# Patient Record
(demographics unavailable — no encounter records)

---

## 2024-10-10 NOTE — HISTORY OF PRESENT ILLNESS
[FreeTextEntry1] : s/p 1st mpj fusion of RF (DOS 6/27/24).  -Patient stated that she is still having pain on the Right Hallux.  Patient stated that she is having issues with ambulation.  Pt indicated that she would like to discuss further sx intervention to help improve alignment of Right Hallux
[FreeTextEntry1] : s/p 1st mpj fusion of RF (DOS 6/27/24).  -Patient stated that she is still having pain on the Right Hallux.  Patient stated that she is having issues with ambulation.  Pt indicated that she would like to discuss further sx intervention to help improve alignment of Right Hallux
Statement Selected

## 2024-10-10 NOTE — ASSESSMENT
[Verbal] : verbal [Patient] : patient [FreeTextEntry1] : S/P R 1st MPJ Fusion   -Patient seen and evaluated in clinic today with all questions and concerns addressed and answered. -Continuation with cam boot WBAT.  -Pt will undergo surgery in November for Revision of Right 1st MPJ Fusion site.

## 2024-10-10 NOTE — PHYSICAL EXAM
[2+] : left foot dorsalis pedis 2+ [Ankle Swelling (On Exam)] : not present [de-identified] : pain on palpation bilateral 1st MPJ limited ROM dorsiflexion 1st MPJ prominent 1st met head right foot  [FreeTextEntry1] : Medial aspect of the right 1st MPJ with overlying fibrotic crust. Periwound erythema. No active drainage.  [Diminished Throughout Right Foot] : normal sensation with monofilament testing throughout right foot [Diminished Throughout Left Foot] : normal sensation with monofilament testing throughout left foot

## 2024-10-10 NOTE — PHYSICAL EXAM
[2+] : left foot dorsalis pedis 2+ [Ankle Swelling (On Exam)] : not present [de-identified] : pain on palpation bilateral 1st MPJ limited ROM dorsiflexion 1st MPJ prominent 1st met head right foot  [FreeTextEntry1] : Medial aspect of the right 1st MPJ with overlying fibrotic crust. Periwound erythema. No active drainage.  [Diminished Throughout Right Foot] : normal sensation with monofilament testing throughout right foot [Diminished Throughout Left Foot] : normal sensation with monofilament testing throughout left foot

## 2024-10-13 NOTE — PHYSICAL EXAM
[de-identified] : Low back exam   No rashes, erythema, edema noted TTP bilateral si joint Positive NAVYA test L Positive Gaenslen's test L Positive SI joint compression test L LLE: 5/5 strength RLE: 5/5 strength Sensation intact b/l LE  R hip   No rashes, erythema, edema TTP at anterior hip, gluteus, GTB Stability: no gross instability ROM: Painful & limited ROM  NAVYA + Gait: limping

## 2024-10-13 NOTE — HISTORY OF PRESENT ILLNESS
[FreeTextEntry1] : HISTORY OF PRESENT ILLNESS: This is a 52 year old woman complaining of bilateral foot pain. The patient has had this pain for 6 years. The pain has worsened in the last 3 years. The pain started after onset of diabetic neuropathy. Patient describes pain as severe. During the last month the pain has been nearly constant with symptoms worse at night. Pain described as burning, sharp and shooting. Pain is associated with numbness and pins and needles into the bilateral feet. Pain is increased with lying down, standing, sitting, walking and relaxation. Bowel or bladder habits have not changed.  Of note, patient has been a type 1 diabetic for many years.  She has also had to left foot surgeries as well as two left shoulder surgeries.  She most recently had a left hip bursectomy.    ACTIVITIES: Patient could walk 2 blocks before the pain starts. Patient can sit 30 minutes  before pain starts. Patient can stand 10 minutes before pain starts. The patient sometimes lays down because of pain. Patient uses no assistive walking device at this time. Patient has difficulty performing household chores, doing yard work or shopping, socializing with friends, participating in recreational activities and exercising at this time.  PRIOR PAIN TREATMENTS:  No prior pain treatment  PRIOR PAIN MEDICATIONS:  Gabapentin and Lyrica  TODAY, 4-: Revisit encounter.   She is under our care for bilateral buttock pain. She has pain which is referred into the hip are and into the lateral thighs. She has significant tenderness along with stiffness and sharp pains. She is s/p a Bilateral sacroiliac joint injection on 3/25/24 which provided her significant relief of her left sided pain. The right sided pain continues to be bothersome. She is not able to sleep on the right side.  Patient has been a type 1 diabetic for many years. Her diabetes is uncontrolled, and her sugar levels fluctuate following the injection. Because of this, I will hold off on repeating the injection at this time.   TODAY, 5-: Revisit encounter.   Since her last visit, she underwent a Bilateral sacroiliac joint injection on 3-. She affords 100% pain relief from her left sided lower back/buttock pain. She still continues today with ongoing right sided lower back/buttock pain. She continues with ongoing pain with sitting or with any transitional movements. She has stiffness on the right side along with occasional sharp pains. Pain is present daily. Of note, she is undergoing surgical correction on her foot on 6-.   Today 8/9/2024 Pt is a 54 year old female who sees us for Diabetic Neuropathy and Bilateral sacroiliac joint injection.  She is s/p Bilateral sacroiliac joint injection on 3- which gave her 100% relief. Today her main complaint is her legs neuropathy, she has trailed Gabapentin, Lyrica in the past with no relief. I discussed re-trailing Lyrica and starting her on Duloxetine and she agrees.  Last A1C 7.0, She is a good candidate for SCS, will consider in the future.   Of note, she is s/p 1st mpj fusion of RF (DOS 6/27/24).  10-: Revisit encounter.   Since her last office visit, she followed up with her podiatrist and may have to undergo another surgical correction for her right foot.   At her last office visit, she was given DULoxetine however it was not helping at much so she stopped taking it. She is currently managing her pain with Pregabalin 100 mg q12h prn. She affords 50% reduction in pain and increase in function with the use of this medication. She now has tolerable pain secondary to her Diabetic Neuropathy. She states her sugar levels have been somewhat under control over the last couple of months.   She has however been experiencing some increased pain around her bilateral buttock area. She does note some referred pain into the hamstrings at times. She has a constant ache like sensation in the area. Her right side is the most severe as opposed to the left. She also notes pain referring into the groin at times. She has pain with internal and external rotation.

## 2024-10-13 NOTE — PHYSICAL EXAM
[de-identified] : Low back exam   No rashes, erythema, edema noted TTP bilateral si joint Positive NAVYA test L Positive Gaenslen's test L Positive SI joint compression test L LLE: 5/5 strength RLE: 5/5 strength Sensation intact b/l LE  R hip   No rashes, erythema, edema TTP at anterior hip, gluteus, GTB Stability: no gross instability ROM: Painful & limited ROM  NAVYA + Gait: limping

## 2024-10-13 NOTE — DISCUSSION/SUMMARY
[de-identified] : A discussion regarding available pain management treatment options occurred with the patient. These included interventional, rehabilitative, pharmacological, and alternative modalities. We will proceed with the following:   Interventional treatment options: 1. Proceed with a right hip injection under fluoroscopic guidance.  The patient is having significant right hip pain with minimal relief with conservative treatments so we decided to proceed with an intra-articular hip injection. The risks and benefits were discussed which included bruising, hematoma, worse pain, intravascular injection, steroid reaction. All questions were answered and concerns addressed. The patient understands that this is likely a temporary solution to their pain. The patient may have up to three intra-articular joint injections a year and needs to consider surgical options for longer term relief of pain should they not improve. They will schedule at the earliest convenience.   Medications: 1. Ordered Pregabalin 100 mg q12h prn.  2. Ordered Cyclobenzaprine 5 mg qhs.   We are prescribing a muscle relaxant medication to help the patient's muscle spasm pain. The patient understands to not take this medication before driving or operating any heavy machinery as it can be sedating.   Renewed Pregabalin. Potential side effects were discussed which included dizziness, sedation, and pedal edema to name a few. If the patient cannot tolerate these side effects should they occur, then they will stop the medication. If the patient experiences sedation, they understand that they should not drive.   - Follow up in 3 months.   I Serene Clemens attest that this documentation has been prepared under the direction and in the presence of provider Dr. Himanshu Griffith.  The documentation recorded by the scribe in my presence, accurately reflects the service I personally performed, and the decisions made by me with my edits as appropriate.   Himanshu Griffith, DO

## 2024-10-13 NOTE — DISCUSSION/SUMMARY
[de-identified] : A discussion regarding available pain management treatment options occurred with the patient. These included interventional, rehabilitative, pharmacological, and alternative modalities. We will proceed with the following:   Interventional treatment options: 1. Proceed with a right hip injection under fluoroscopic guidance.  The patient is having significant right hip pain with minimal relief with conservative treatments so we decided to proceed with an intra-articular hip injection. The risks and benefits were discussed which included bruising, hematoma, worse pain, intravascular injection, steroid reaction. All questions were answered and concerns addressed. The patient understands that this is likely a temporary solution to their pain. The patient may have up to three intra-articular joint injections a year and needs to consider surgical options for longer term relief of pain should they not improve. They will schedule at the earliest convenience.   Medications: 1. Ordered Pregabalin 100 mg q12h prn.  2. Ordered Cyclobenzaprine 5 mg qhs.   We are prescribing a muscle relaxant medication to help the patient's muscle spasm pain. The patient understands to not take this medication before driving or operating any heavy machinery as it can be sedating.   Renewed Pregabalin. Potential side effects were discussed which included dizziness, sedation, and pedal edema to name a few. If the patient cannot tolerate these side effects should they occur, then they will stop the medication. If the patient experiences sedation, they understand that they should not drive.   - Follow up in 3 months.   I Serene Clemens attest that this documentation has been prepared under the direction and in the presence of provider Dr. Himanshu Griffith.  The documentation recorded by the scribe in my presence, accurately reflects the service I personally performed, and the decisions made by me with my edits as appropriate.   Himanshu Griffith, DO

## 2024-10-13 NOTE — DATA REVIEWED
[FreeTextEntry1] : X-ray of the hips taken on 1- IMPRESSION: No acute fracture or dislocation. 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 There is mild degenerative changes within bilateral sacroiliac joints, pubic symphysis, and visualized lumbar spine. There are vascular calcifications noted.  MRI of the lumbar spine taken on 2- IMPRESSION 1.  Mild L4-5 and L5-S1 degenerative change 2.  No significant thecal sac or nerve root compression

## 2024-10-15 NOTE — PHYSICAL EXAM
[de-identified] : Low back exam   No rashes, erythema, edema noted TTP bilateral si joint Positive NAVYA test L Positive Gaenslen's test L Positive SI joint compression test L LLE: 5/5 strength RLE: 5/5 strength Sensation intact b/l LE  R hip   No rashes, erythema, edema TTP at anterior hip, gluteus, GTB Stability: no gross instability ROM: Painful & limited ROM  NAVYA + Gait: limping

## 2024-10-15 NOTE — PHYSICAL EXAM
[de-identified] : Low back exam   No rashes, erythema, edema noted TTP bilateral si joint Positive NAVYA test L Positive Gaenslen's test L Positive SI joint compression test L LLE: 5/5 strength RLE: 5/5 strength Sensation intact b/l LE  R hip   No rashes, erythema, edema TTP at anterior hip, gluteus, GTB Stability: no gross instability ROM: Painful & limited ROM  NAVYA + Gait: limping

## 2024-10-15 NOTE — HISTORY OF PRESENT ILLNESS
[FreeTextEntry1] : HISTORY OF PRESENT ILLNESS: This is a 54 year old woman complaining of bilateral foot pain. The patient has had this pain for 6 years. The pain has worsened in the last 3 years. The pain started after onset of diabetic neuropathy. Patient describes pain as severe. During the last month the pain has been nearly constant with symptoms worse at night. Pain described as burning, sharp and shooting. Pain is associated with numbness and pins and needles into the bilateral feet. Pain is increased with lying down, standing, sitting, walking and relaxation. Bowel or bladder habits have not changed.  Of note, patient has been a type 1 diabetic for many years.  She has also had to left foot surgeries as well as two left shoulder surgeries.  She most recently had a left hip bursectomy.    ACTIVITIES: Patient could walk 2 blocks before the pain starts. Patient can sit 30 minutes  before pain starts. Patient can stand 10 minutes before pain starts. The patient sometimes lays down because of pain. Patient uses no assistive walking device at this time. Patient has difficulty performing household chores, doing yard work or shopping, socializing with friends, participating in recreational activities and exercising at this time.  PRIOR PAIN TREATMENTS:  No prior pain treatment  PRIOR PAIN MEDICATIONS:  Gabapentin and Lyrica  TODAY, 4-: Revisit encounter.   She is under our care for bilateral buttock pain. She has pain which is referred into the hip are and into the lateral thighs. She has significant tenderness along with stiffness and sharp pains. She is s/p a Bilateral sacroiliac joint injection on 3/25/24 which provided her significant relief of her left sided pain. The right sided pain continues to be bothersome. She is not able to sleep on the right side.  Patient has been a type 1 diabetic for many years. Her diabetes is uncontrolled, and her sugar levels fluctuate following the injection. Because of this, I will hold off on repeating the injection at this time.   TODAY: 10-: Revisit encounter.  At her last office visit, she was given DULoxetine however it was not helping at much so she stopped taking it. She is currently managing her pain with Pregabalin 100 mg q12h prn. She affords 50% reduction in pain and increase in function with the use of this medication. She now has tolerable pain secondary to her Diabetic Neuropathy. She states her sugar levels have been somewhat under control over the last couple of months.   She has however been experiencing some increased pain around her bilateral buttock area. She does note some referred pain into the hamstrings at times. She has a constant ache like sensation in the area. Her right side is the most severe as opposed to the left. She also notes pain referring into the groin at times. She has pain with internal and external rotation. The pain is severe and makes it difficult for her to perform her ADLs. She is pending a right hip injection on 10/21/24.

## 2024-10-15 NOTE — DISCUSSION/SUMMARY
[de-identified] : A discussion regarding available pain management treatment options occurred with the patient. These included interventional, rehabilitative, pharmacological, and alternative modalities. We will proceed with the following:   Interventional treatment options: 1. Proceed with a right hip injection under fluoroscopic guidance.  The patient is having significant right hip pain with minimal relief with conservative treatments, so we decided to proceed with an intra-articular hip injection. The risks and benefits were discussed which included bruising, hematoma, worse pain, intravascular injection, steroid reaction. All questions were answered and concerns addressed. The patient understands that this is likely a temporary solution to their pain. The patient may have up to three intra-articular joint injections a year and needs to consider surgical options for longer term relief of pain should they not improve. They will schedule at the earliest convenience.   Medications: 1. Ordered Pregabalin 100 mg q12h prn.  2. Ordered Cyclobenzaprine 5 mg qhs.  3. Ordered Tramadol 50 mg every 6 hours.   We are prescribing a muscle relaxant medication to help the patient's muscle spasm pain. The patient understands to not take this medication before driving or operating any heavy machinery as it can be sedating.   Renewed Pregabalin. Potential side effects were discussed which included dizziness, sedation, and pedal edema to name a few. If the patient cannot tolerate these side effects should they occur, then they will stop the medication. If the patient experiences sedation, they understand that they should not drive.   - Follow up in 1-2 weeks after the injection.    istop reviewed  JESSICA Barker attest that this documentation has been prepared under the direction and in the presence of provider Dr. Himanshu Griffith.  The documentation recorded by the scribe in my presence, accurately reflects the service I personally performed, and the decisions made by me with my edits as appropriate.   Himanshu Griffith, DO

## 2024-10-15 NOTE — DISCUSSION/SUMMARY
[de-identified] : A discussion regarding available pain management treatment options occurred with the patient. These included interventional, rehabilitative, pharmacological, and alternative modalities. We will proceed with the following:   Interventional treatment options: 1. Proceed with a right hip injection under fluoroscopic guidance.  The patient is having significant right hip pain with minimal relief with conservative treatments, so we decided to proceed with an intra-articular hip injection. The risks and benefits were discussed which included bruising, hematoma, worse pain, intravascular injection, steroid reaction. All questions were answered and concerns addressed. The patient understands that this is likely a temporary solution to their pain. The patient may have up to three intra-articular joint injections a year and needs to consider surgical options for longer term relief of pain should they not improve. They will schedule at the earliest convenience.   Medications: 1. Ordered Pregabalin 100 mg q12h prn.  2. Ordered Cyclobenzaprine 5 mg qhs.  3. Ordered Tramadol 50 mg every 6 hours.   We are prescribing a muscle relaxant medication to help the patient's muscle spasm pain. The patient understands to not take this medication before driving or operating any heavy machinery as it can be sedating.   Renewed Pregabalin. Potential side effects were discussed which included dizziness, sedation, and pedal edema to name a few. If the patient cannot tolerate these side effects should they occur, then they will stop the medication. If the patient experiences sedation, they understand that they should not drive.   - Follow up in 1-2 weeks after the injection.    istop reviewed  JESSICA Barker attest that this documentation has been prepared under the direction and in the presence of provider Dr. Himanshu Griffith.  The documentation recorded by the scribe in my presence, accurately reflects the service I personally performed, and the decisions made by me with my edits as appropriate.   Himanshu Griffith, DO

## 2024-10-22 NOTE — PROCEDURE
[FreeTextEntry3] : Date of Service: 10/21/2024   Account: [id number]  Patient: HERNANDEZ DAVIS   YOB: 1970  Age: 54 year  Surgeon:      Himanshu Griffith DO  Pre-Operative Diagnosis: Right Primary Osteoarthritis of Hip (M16.0)  Post-Operative Diagnosis: Same  Procedure: Right Hip arthrogram and steroid injection under fluoroscopic guidance.    This procedure was carried out using fluoroscopic guidance. The risks and benefits of the procedure were discussed extensively with the patient. The consent of the patient was obtained and the following procedure was performed. The patient was placed in the supine position on the fluoroscopic table and the area was prepped and draped in a sterile fashion. A timeout was performed with all essential staff present and the site and side were verified.  The patient was placed in the supine position with right hip flexed and externally rotated 25 degrees. The area of the right groin was prepped and draped in a sterile fashion. The fluoroscopic image intensifier was then positioned so that the right hip appeared in view, and the midline intertrochanteric region was identified and marked. A 25 gauge spinal needle was then inserted and directed into this right hip intra-capsular region. After negative aspiration for heme and CSF, 3 cc of Omnipaque was injected and appeared to fill the joint margins.  Right hip arthrogram showed no intravascular flow, and good spread around the femoral head and to the acetabulum. An injectate of 4 cc 0.25% Marcaine plus 10mg decadron was then injected into the right hip space.   The needle was subsequently removed. Vital signs remained normal. Pulse oximeter was used throughout the procedure and the patient's pulse and oxygen saturation remained within normal limits. The patient tolerated the procedure well. There were no complications. The patient was instructed to apply ice over the injection sites for twenty minutes every two hours for the next 24 to 48 hours.  Disposition:   1. The patient was advised to F/U in 1-2 weeks to assess the response to the injection.  2. The patient was also instructed to contact me immediately if there were any concerns related to the procedure performed.

## 2024-11-04 NOTE — PHYSICAL EXAM
[Ankle Swelling (On Exam)] : not present [2+] : left foot dorsalis pedis 2+ [de-identified] : pain on palpation bilateral 1st MPJ limited ROM dorsiflexion 1st MPJ prominent 1st met head right foot  [FreeTextEntry1] : Medial aspect of the right 1st MPJ with overlying fibrotic crust. Periwound erythema. No active drainage.  [Diminished Throughout Right Foot] : normal sensation with monofilament testing throughout right foot [Diminished Throughout Left Foot] : normal sensation with monofilament testing throughout left foot

## 2024-11-04 NOTE — HISTORY OF PRESENT ILLNESS
[FreeTextEntry1] : s/p 1st mpj fusion of RF (DOS 6/27/24).  -Patient stated that she is still having pain on the Right Hallux.  Patient stated that she is having issues with ambulation.  Pt indicated that she would like to discuss further sx intervention to help improve alignment of Right Hallux possible hardware removal

## 2024-11-04 NOTE — ASSESSMENT
[FreeTextEntry1] : S/P R 1st MPJ Fusion   -Patient seen and evaluated in clinic today with all questions and concerns addressed and answered. - Regarding the procedure, we discussed scarring, poor wound healing, bleeding, infection, need for additional surgery, and dissatisfaction with the outcome. Also discussed possibility of keloid and/or hypertrophic scar formation as well as recurrence. - All questions were answered and risks understood. - Discussed surgery in detailed will require possible hardware removal of first MPJ plate - soft tissue incision and possible bone work discussed in detail -Continuation with cam boot WBAT.  - CT scan ordered for further surgical Plan  -Pt will undergo surgery in December for Revision of Right 1st MPJ Fusion site.   [Verbal] : verbal [Patient] : patient

## 2024-11-07 NOTE — DISCUSSION/SUMMARY
[de-identified] : A discussion regarding available pain management treatment options occurred with the patient. These included interventional, rehabilitative, pharmacological, and alternative modalities. We will proceed with the following:   Interventional treatment options: - Deferred for now as she is going to be undergoing surgical correction for her right foot.   Medications: 1. Increased Pregabalin to 150 mg q12h prn.   ISTOP Checked Reference # [ [id number] ]  Increased Pregabalin. Potential side effects were discussed which included dizziness, sedation, and pedal edema to name a few. If the patient cannot tolerate these side effects should they occur, then they will stop the medication. If the patient experiences sedation, they understand that they should not drive.   - Follow up after right foot surgery.   I Serene Clemens attest that this documentation has been prepared under the direction and in the presence of provider Dr. Himanshu Griffith.  The documentation recorded by the scribe in my presence, accurately reflects the service I personally performed, and the decisions made by me with my edits as appropriate.   Himanshu Griffith, DO

## 2024-11-07 NOTE — PHYSICAL EXAM
[de-identified] : Low back exam   No rashes, erythema, edema noted TTP bilateral si joint Positive NAVYA test L Positive Gaenslen's test L Positive SI joint compression test L LLE: 5/5 strength RLE: 5/5 strength Sensation intact b/l LE  R hip   No rashes, erythema, edema TTP at anterior hip, gluteus, GTB Stability: no gross instability ROM: Painful & limited ROM  NAVYA + Gait: limping

## 2024-11-07 NOTE — PHYSICAL EXAM
[de-identified] : Low back exam   No rashes, erythema, edema noted TTP bilateral si joint Positive NAVYA test L Positive Gaenslen's test L Positive SI joint compression test L LLE: 5/5 strength RLE: 5/5 strength Sensation intact b/l LE  R hip   No rashes, erythema, edema TTP at anterior hip, gluteus, GTB Stability: no gross instability ROM: Painful & limited ROM  NAVYA + Gait: limping

## 2024-11-07 NOTE — DISCUSSION/SUMMARY
[de-identified] : A discussion regarding available pain management treatment options occurred with the patient. These included interventional, rehabilitative, pharmacological, and alternative modalities. We will proceed with the following:   Interventional treatment options: - Deferred for now as she is going to be undergoing surgical correction for her right foot.   Medications: 1. Increased Pregabalin to 150 mg q12h prn.   ISTOP Checked Reference # [ [id number] ]  Increased Pregabalin. Potential side effects were discussed which included dizziness, sedation, and pedal edema to name a few. If the patient cannot tolerate these side effects should they occur, then they will stop the medication. If the patient experiences sedation, they understand that they should not drive.   - Follow up after right foot surgery.   I Serene Clemens attest that this documentation has been prepared under the direction and in the presence of provider Dr. Himanshu Griffith.  The documentation recorded by the scribe in my presence, accurately reflects the service I personally performed, and the decisions made by me with my edits as appropriate.   Himanshu Griffith, DO

## 2024-11-07 NOTE — HISTORY OF PRESENT ILLNESS
[FreeTextEntry1] : HISTORY OF PRESENT ILLNESS: This is a 54 year old woman complaining of bilateral foot pain. The patient has had this pain for 6 years. The pain has worsened in the last 3 years. The pain started after onset of diabetic neuropathy. Patient describes pain as severe. During the last month the pain has been nearly constant with symptoms worse at night. Pain described as burning, sharp and shooting. Pain is associated with numbness and pins and needles into the bilateral feet. Pain is increased with lying down, standing, sitting, walking and relaxation. Bowel or bladder habits have not changed.  Of note, patient has been a type 1 diabetic for many years.  She has also had to left foot surgeries as well as two left shoulder surgeries.  She most recently had a left hip bursectomy.    ACTIVITIES: Patient could walk 2 blocks before the pain starts. Patient can sit 30 minutes  before pain starts. Patient can stand 10 minutes before pain starts. The patient sometimes lays down because of pain. Patient uses no assistive walking device at this time. Patient has difficulty performing household chores, doing yard work or shopping, socializing with friends, participating in recreational activities and exercising at this time.  PRIOR PAIN TREATMENTS:  No prior pain treatment  PRIOR PAIN MEDICATIONS:  Gabapentin and Lyrica  TODAY, 4-: Revisit encounter.   She is under our care for bilateral buttock pain. She has pain which is referred into the hip are and into the lateral thighs. She has significant tenderness along with stiffness and sharp pains. She is s/p a Bilateral sacroiliac joint injection on 3/25/24 which provided her significant relief of her left sided pain. The right sided pain continues to be bothersome. She is not able to sleep on the right side.  Patient has been a type 1 diabetic for many years. Her diabetes is uncontrolled, and her sugar levels fluctuate following the injection. Because of this, I will hold off on repeating the injection at this time.   TODAY: 10-: Revisit encounter.  At her last office visit, she was given DULoxetine however it was not helping at much so she stopped taking it. She is currently managing her pain with Pregabalin 100 mg q12h prn. She affords 50% reduction in pain and increase in function with the use of this medication. She now has tolerable pain secondary to her Diabetic Neuropathy. She states her sugar levels have been somewhat under control over the last couple of months.   She has however been experiencing some increased pain around her bilateral buttock area. She does note some referred pain into the hamstrings at times. She has a constant ache like sensation in the area. Her right side is the most severe as opposed to the left. She also notes pain referring into the groin at times. She has pain with internal and external rotation. The pain is severe and makes it difficult for her to perform her ADLs. She is pending a right hip injection on 10/21/24.   11-5-2024: Revisit encounter.   Since her last office visit, she underwent a right hip injection on 10-. Day of and present, she had no relief of pain. She walked out of the procedure feeling worsening pain. She continues today with ongoing severe pain in the right hip and buttock area. She does note referred pain wrapping around into the hip area. Her pain is made worse with sitting, standing or walking for prolonged periods of time. She also notes pain with internal and external rotation of the hips. Her pain is present daily. She rates the pain at a 7/10 on the pain scale. She has not been sleeping at night due to the pain. She is managing her pain with Pregabalin 100 mg q12h. She affords about 30-50% reduction in pain and increase in function with the use of this medication.  Of note, she is going to be undergoing surgical correction to her right foot in the coming weeks.

## 2024-11-15 NOTE — ASSESSMENT
[FreeTextEntry1] : S/P R 1st MPJ Fusion   -Patient seen and evaluated in clinic today with all questions and concerns addressed and answered. - Regarding the procedure, we discussed scarring, poor wound healing, bleeding, infection, need for additional surgery, and dissatisfaction with the outcome. Also discussed possibility of keloid and/or hypertrophic scar formation as well as recurrence. - All questions were answered and risks understood. - Discussed surgery in detailed will require possible hardware removal of first MPJ plate - soft tissue incision and possible bone work discussed in detail -Continuation with cam boot WBAT.  - CT scan reviewed and showing fusion of MPJ and possible screw causing irritation of sesamoid causing most of her pain -Pt will undergo surgery in December for Revision of Right 1st MPJ Fusion site and possible akin osteotomy   [Verbal] : verbal [Patient] : patient

## 2024-11-15 NOTE — PHYSICAL EXAM
[Ankle Swelling (On Exam)] : not present [2+] : left foot dorsalis pedis 2+ [de-identified] : pain on palpation bilateral 1st MPJ limited ROM dorsiflexion 1st MPJ prominent 1st met head right foot  [FreeTextEntry1] : Medial aspect of the right 1st MPJ with overlying fibrotic crust. Periwound erythema. No active drainage.  [Diminished Throughout Right Foot] : normal sensation with monofilament testing throughout right foot [Diminished Throughout Left Foot] : normal sensation with monofilament testing throughout left foot

## 2024-12-13 NOTE — ASSESSMENT
[FreeTextEntry1] : Status post hardware removal distal Akin right foot Patient examined Area appears to be healing No concern for infection at this point Patient to continue antibiotics and pain meds Follow-up in 1 week   [Verbal] : verbal [Patient] : patient

## 2024-12-13 NOTE — PHYSICAL EXAM
[facility]  [address]  [facility] [address]               Godfrey Harper   2017 12:45 PM   ED    Description:  Male : 1954   Department:  [facility]           Your Care was Coordinated By:     Provider Role From To    Ambreen Berumen MD Attending Provider 17 1257 --    Lou Yee PA-C Physician Assistant 17 1245 --      Reason for Visit     Loss of Consciousness           Diagnoses this Visit        Comments    Influenza A    -  Primary     Syncope, unspecified syncope type         Elevated CPK           ED Disposition     ED Disposition Condition Comment    Discharge             To Do List           Follow-up Information     Follow up with Uriel Arnulfocamilo - Internal Medicine. Schedule an appointment as soon as possible for a visit in 1 week.    Specialty:  Internal Medicine    Contact information:    [address]  [facility] [id number] [phone]    Additional information:    [facility]       These Medications        Disp Refills Start End    oseltamivir (TAMIFLU) 75 MG capsule 10 capsule 0 2017    Take 1 capsule (75 mg total) by mouth 2 (two) times daily. - Oral    Pharmacy: [facility] #[id number] - [place], [place] - [address]  #: [phone]         [facility] On [facility]     [facility] -  Assistance  Registered nurses in the [facility] provide clinical advisement, health education, appointment booking, and other advisory services.  Call for this free service at [phone].             Medications           Message regarding Medications     Verify the changes and/or additions to your medication regime listed below are the same as discussed with your clinician today.  If any of these changes or additions are incorrect, please notify your healthcare provider.        START taking these NEW medications      "   Refills    oseltamivir (TAMIFLU) 75 MG capsule 0    Sig: Take 1 capsule (75 mg total) by mouth 2 (two) times daily.    Class: Print    Route: Oral      These medications were administered today        Dose Freq    sodium chloride 0.9% bolus 1,000 mL 1,000 mL ED 1 Time    Sig: Inject 1,000 mLs into the vein ED 1 Time.    Class: Normal    Route: Intravenous           Verify that the below list of medications is an accurate representation of the medications you are currently taking.  If none reported, the list may be blank. If incorrect, please contact your healthcare provider. Carry this list with you in case of emergency.           Current Medications     esomeprazole magnesium (NEXIUM) 10 mg GrPS Take 10 mg by mouth before breakfast.    oseltamivir (TAMIFLU) 75 MG capsule Take 1 capsule (75 mg total) by mouth 2 (two) times daily.           Clinical Reference Information           Your Vitals Were     BP Pulse Temp Resp Height Weight    110/58 (BP Location: Left arm, Patient Position: Sitting, BP Method: Automatic) 79 99.9 °F (37.7 °C) 20 5' 7" (1.702 m) 59 kg (130 lb)    SpO2 BMI             96% 20.36 kg/m2         Allergies as of 2/22/2017     No Known Allergies      Immunizations Administered on Date of Encounter - 2/22/2017     None      ED Micro, Lab, POCT     Start Ordered       Status Ordering Provider    02/22/17 1548 02/22/17 1547  CK  STAT      Final result     02/22/17 1255 02/22/17 1255  CBC auto differential  STAT      Final result     02/22/17 1255 02/22/17 1255  Comprehensive metabolic panel  STAT      Final result     02/22/17 1255 02/22/17 1255  Urinalysis  STAT      Final result     02/22/17 1255 02/22/17 1255  Influenza antigen  STAT      Final result     02/22/17 1255 02/22/17 1255  CK  STAT      Final result     02/22/17 1255 02/22/17 1255  Urinalysis Microscopic  Once      Final result       ED Imaging Orders     Start Ordered       Status Ordering Provider    02/22/17 1302 02/22/17 1301  " X-Ray Chest PA And Lateral  1 time imaging      Final result         Discharge Instructions       Rest.  Drink plenty of fluids.  Tylenol as directed as needed for fever/pain.  Return to the ED for any new or worsening symptoms.    Discharge References/Attachments     INFLUENZA (ADULT) (ENGLISH)    SYNCOPE, UNK CAUSE (ENGLISH)    DEHYDRATION (ADULT) (ENGLISH)    DEHYDRATION (ENGLISH)      Smoking Cessation     If you would like to quit smoking:   You may be eligible for free services if you are a [place] resident and started smoking cigarettes before September 1, 1988.  Call the Smoking Cessation [employer] ([facility]) toll free at [phone] or [phone].   Call 8-935-QUIT-NOW if you do not meet the above criteria.             [facility] complies with applicable Federal civil rights laws and does not discriminate on the basis of race, color, national origin, age, disability, or sex.        Language Assistance Services     ATTENTION: Language assistance services are available, free of charge. Please call [phone].      ATENCIÓN: Si habla español, tiene a sebastian disposición servicios gratuitos de asistencia lingüística. Llame al [phone].     CHÚ Ý: N?u b?n nói Ti?ng Vi?t, có các d?ch v? h? tr? ngôn ng? mi?n phí dành cho b?n. G?i s? [phone].         [Ankle Swelling (On Exam)] : not present [2+] : left foot dorsalis pedis 2+ [de-identified] : Denies any pain at surgical site [FreeTextEntry1] : Surgical site appears clean and intact sutures in place no signs of dehiscence [Diminished Throughout Right Foot] : normal sensation with monofilament testing throughout right foot [Diminished Throughout Left Foot] : normal sensation with monofilament testing throughout left foot

## 2024-12-13 NOTE — HISTORY OF PRESENT ILLNESS
[FreeTextEntry1] : Status post right foot hardware removal and distal Akin Dressing appears clean dry and intact  Patient denies pain Patient presents with use of a knee scooter Patient states she did have a fall this past week Notes some pain in her right ankle

## 2024-12-23 NOTE — PHYSICAL EXAM
[General Appearance - Alert] : alert [General Appearance - In No Acute Distress] : in no acute distress [2+] : right foot dorsalis pedis 2+ [Ankle Swelling (On Exam)] : not present [Delayed in the Right Toes] : capillary refills normal in right toes [de-identified] : Denies any pain at surgical site [Foot Ulcer] : no foot ulcer [FreeTextEntry1] : Surgical site appears clean and intact sutures in place no signs of dehiscence No signs of infection [Diminished Throughout Right Foot] : normal sensation with monofilament testing throughout right foot [Diminished Throughout Left Foot] : normal sensation with monofilament testing throughout left foot

## 2024-12-23 NOTE — HISTORY OF PRESENT ILLNESS
[Post-op Sandals] : post-op sandals [Other: ___] : [unfilled] [FreeTextEntry1] : Status post right foot hardware removal and distal Akin Dressing appears clean dry and intact  Patient denies pain Patient presents with use of a knee scooter

## 2024-12-23 NOTE — ASSESSMENT
[Verbal] : verbal [Patient] : patient [FreeTextEntry1] : Status post hardware removal distal Akin right foot Patient examined, findings discussed Incision site well coapted Sutures removed Area covered with bacitracin and gauze Continue surgical shoe use for 1 week then transition to normal shoegear w/o knee scooter Xray ordered to be taken prior to next appt Follow-up in 4 weeks

## 2024-12-23 NOTE — PHYSICAL EXAM
[facility] [General Appearance - Alert] : alert [General Appearance - In No Acute Distress] : in no acute distress [2+] : right foot dorsalis pedis 2+ [Ankle Swelling (On Exam)] : not present [Delayed in the Right Toes] : capillary refills normal in right toes [de-identified] : Denies any pain at surgical site [Foot Ulcer] : no foot ulcer [FreeTextEntry1] : Surgical site appears clean and intact sutures in place no signs of dehiscence No signs of infection [Diminished Throughout Right Foot] : normal sensation with monofilament testing throughout right foot [Diminished Throughout Left Foot] : normal sensation with monofilament testing throughout left foot

## 2024-12-31 NOTE — HISTORY OF PRESENT ILLNESS
[FreeTextEntry1] : HISTORY OF PRESENT ILLNESS: This is a 54 year old woman complaining of bilateral foot pain. The patient has had this pain for 6 years. The pain has worsened in the last 3 years. The pain started after onset of diabetic neuropathy. Patient describes pain as severe. During the last month the pain has been nearly constant with symptoms worse at night. Pain described as burning, sharp and shooting. Pain is associated with numbness and pins and needles into the bilateral feet. Pain is increased with lying down, standing, sitting, walking and relaxation. Bowel or bladder habits have not changed.  Of note, patient has been a type 1 diabetic for many years.  She has also had to left foot surgeries as well as two left shoulder surgeries.  She most recently had a left hip bursectomy.    ACTIVITIES: Patient could walk 2 blocks before the pain starts. Patient can sit 30 minutes  before pain starts. Patient can stand 10 minutes before pain starts. The patient sometimes lays down because of pain. Patient uses no assistive walking device at this time. Patient has difficulty performing household chores, doing yard work or shopping, socializing with friends, participating in recreational activities and exercising at this time.  PRIOR PAIN TREATMENTS:  No prior pain treatment  PRIOR PAIN MEDICATIONS:  Gabapentin and Lyrica  TODAY, 4-: Revisit encounter.   She is under our care for bilateral buttock pain. She has pain which is referred into the hip are and into the lateral thighs. She has significant tenderness along with stiffness and sharp pains. She is s/p a Bilateral sacroiliac joint injection on 3/25/24 which provided her significant relief of her left sided pain. The right sided pain continues to be bothersome. She is not able to sleep on the right side.  Patient has been a type 1 diabetic for many years. Her diabetes is uncontrolled, and her sugar levels fluctuate following the injection. Because of this, I will hold off on repeating the injection at this time.   TODAY: 10-: Revisit encounter.  At her last office visit, she was given DULoxetine however it was not helping at much so she stopped taking it. She is currently managing her pain with Pregabalin 100 mg q12h prn. She affords 50% reduction in pain and increase in function with the use of this medication. She now has tolerable pain secondary to her Diabetic Neuropathy. She states her sugar levels have been somewhat under control over the last couple of months.   She has however been experiencing some increased pain around her bilateral buttock area. She does note some referred pain into the hamstrings at times. She has a constant ache like sensation in the area. Her right side is the most severe as opposed to the left. She also notes pain referring into the groin at times. She has pain with internal and external rotation. The pain is severe and makes it difficult for her to perform her ADLs. She is pending a right hip injection on 10/21/24.   11-5-2024: Revisit encounter.   Since her last office visit, she underwent a right hip injection on 10-. Day of and present, she had no relief of pain. She walked out of the procedure feeling worsening pain. She continues today with ongoing severe pain in the right hip and buttock area. She does note referred pain wrapping around into the hip area. Her pain is made worse with sitting, standing or walking for prolonged periods of time. She also notes pain with internal and external rotation of the hips. Her pain is present daily. She rates the pain at a 7/10 on the pain scale. She has not been sleeping at night due to the pain. She is managing her pain with Pregabalin 100 mg q12h. She affords about 30-50% reduction in pain and increase in function with the use of this medication.  Of note, she is going to be undergoing surgical correction to her right foot in the coming weeks.     TODAY (12/31/24)  Patient is a 54-year-old female who is here today for follow-up, is under our care for bilateral hip pain and bilateral neuropathy secondary to DM type I. As for her bilateral neuropathy, patient is currently maintained on pregabalin 150 mg twice daily, patient states that the pregabalin is interacting with her Mounjaro, she is unable to control her blood sugar and she has had to increase her insulin intake due to that.  She is requesting to decrease the pregabalin dosage. As for her bilateral hip pain, Patient has referred pain into the groin and occasionally into the anterior portion of the thigh. Pain is made worse with weight bearing or getting up from a seated position. Pain is associated with sharp, stabbing pains. Pain is present daily and limits the patient's ADLs. Pain is worse in the morning and it gets better throughout the day.  She is undergone a right hip injection in the past on 10/21/2024 which gave her no relief. Of note, she underwent surgical correction of the right foot and she is healing well.

## 2024-12-31 NOTE — PHYSICAL EXAM
[de-identified] : Low back exam   No rashes, erythema, edema noted TTP bilateral si joint Positive NAVYA test B/L  LLE: 5/5 strength RLE: 5/5 strength Sensation intact b/l LE  R hip   No rashes, erythema, edema TTP at anterior hip, gluteus, GTB Stability: no gross instability ROM: Painful & limited ROM  NAVYA + Gait: limping

## 2024-12-31 NOTE — DISCUSSION/SUMMARY
[de-identified] : A discussion regarding available pain management treatment options occurred with the patient. These included interventional, rehabilitative, pharmacological, and alternative modalities. We will proceed with the following:   Interventional treatment options: 1. Pt is considering SCS, I will send them for a neuropsych eval prior to scheduling them for SCS trail.  Risk and benefit of the procedure was discussed with the patient and they were given a brochure explaining the SCS procedure.  Patient suffers from neuropathic pain and has failed pain management modalities.  Imagin. Ordered an MRI of the B/L hip to rule out any internal derangement. MRI B/l hip w/o contrast to evaluate for anatomic changes of the surrounding tissue that will help provide information to accurately diagnose the patient's cause of pain and therefore treat said pain generator in the most effective way possible - whether that be specific physical therapy recommendations, medications, and/or interventional therapies.  Medications: 1. Decrease Pregabalin to 50 mg q12h prn.  Potential side effects were discussed which included dizziness, sedation, and pedal edema to name a few. If the patient cannot tolerate these side effects should they occur, then they will stop the medication. If the patient experiences sedation, they understand that they should not drive.   - Follow up in 3 weeks after neuropsych eval  ZULY Sparks,

## 2025-01-10 NOTE — HISTORY OF PRESENT ILLNESS
[FreeTextEntry1] : HERNANDEZ DAVIS is a 55-year-old female, with a PMHx significant for PVCs, h/o HTN, HLD, DM, diabetic neuropathy, autonomic dysfunction with orthostatic hypotension, and GERD, who presents today for a follow-up visit. Patient reports she has been experiencing worsening orthostatic symptoms when going from a sitting to standing position. Patient utilizes Metoprolol; however, she noticed that her BP drops when she uses the Metoprolol and when she does not use it, PVC symptoms become very symptomatic. Patient to possibly undergo spinal simulator surgery and is undergoing evaluation.

## 2025-01-10 NOTE — REVIEW OF SYSTEMS
[Negative] : Heme/Lymph [SOB] : no shortness of breath [Dyspnea on exertion] : not dyspnea during exertion [Chest Discomfort] : no chest discomfort [Lower Ext Edema] : no extremity edema [Leg Claudication] : no intermittent leg claudication [Syncope] : no syncope [FreeTextEntry5] : (+) Palpitations [FreeTextEntry2] : (+) Dizziness

## 2025-01-10 NOTE — CARDIOLOGY SUMMARY
[de-identified] : 01/08/2025: NSR, (-) PVCs.  11/21/2024: NSR, normal axis, normal intervals, (-) ST-T wave changes. =======================================================

## 2025-01-10 NOTE — CARDIOLOGY SUMMARY
[de-identified] : 01/08/2025: NSR, (-) PVCs.  11/21/2024: NSR, normal axis, normal intervals, (-) ST-T wave changes. =======================================================

## 2025-01-10 NOTE — DISCUSSION/SUMMARY
[EKG obtained to assist in diagnosis and management of assessed problem(s)] : EKG obtained to assist in diagnosis and management of assessed problem(s) [FreeTextEntry1] : EKG performed today with no evidence of PVCs.   PVCs: The impression is premature ventricular contractions. Will likely discuss with EP regarding the use of Flecainide to decrease PVC burden as using Metoprolol causes worsening orthostatic hypotension.   Orthostatic hypotension: Recommend a trial of Midodrine 2.5 mg TID PRN to alleviate patient's symptoms. Patient advised to change position slowly and maintain adequate salt and water intake.   HTN: The impression is hypertension. BP is currently controlled. Patient has discontinued all BP medications. Other planned treatments include an exercise regimen, low sodium diet, and alcohol moderation.  DM: The impression is diabetes mellitus. Patient advised to continue taking medications as prescribed, closely monitor blood sugar at home, follow a diabetic diet, and follow up for continued monitoring.  HLD: The impression is hyperlipidemia. Currently, the condition is stable. There are no changes in medication management. Continue current medical therapy. Other planned treatment includes diet modification, exercise, and weight loss.  Follow up in 6 months.  Plan was discussed with the patient.

## 2025-01-17 NOTE — PHYSICAL EXAM
[General Appearance - Alert] : alert [General Appearance - In No Acute Distress] : in no acute distress [2+] : right foot dorsalis pedis 2+ [Ankle Swelling (On Exam)] : not present [Delayed in the Right Toes] : capillary refills normal in right toes [de-identified] : Pain sub 1st MPJ  Pain sub 5th MPJ  [Foot Ulcer] : no foot ulcer [FreeTextEntry1] : Surgical site - healed [Diminished Throughout Right Foot] : normal sensation with monofilament testing throughout right foot [Diminished Throughout Left Foot] : normal sensation with monofilament testing throughout left foot

## 2025-01-17 NOTE — HISTORY OF PRESENT ILLNESS
[Post-op Sandals] : post-op sandals [Other: ___] : [unfilled] [FreeTextEntry1] : Patient is a 56 yo female who presents to clinic for post operative evaluation  -S/p RF hardware removal and distal akin -Complains of pain sub 1st MPJ and lateral/plantar 5th

## 2025-01-23 NOTE — DISCUSSION/SUMMARY
[de-identified] : A discussion regarding available pain management treatment options occurred with the patient. These included interventional, rehabilitative, pharmacological, and alternative modalities. We will proceed with the following:   Imaging:  - MRI of the thoracic spine was ordered to for SCS planning.  MRI thoracic spine w/o contrast to evaluate for anatomic changes of the lumbar discs, nerves, and surrounding tissue that will help provide information to accurately diagnose the patient's cause of pain and therefore treat said pain generator in the most effective way possible - whether that be specific physical therapy recommendations, medications, and/or interventional therapies.   Interventional treatment options: 1. A trial treatment of dorsal column spinal cord stimulation using a temporary stimulator in the epidural space is medically necessary at this point as the patient has severe and chronic neuropathic pain of the [UPPER/LOWER] extremity from actual damage to peripheral nerves from degenerative disk disease (DDD) or herniated disk or other multiple degenerative changes or failed back surgery syndrome or polyneuropathy and other pain management modalities (pharmacologic, surgical, psychological, and physical therapies) have been tried and failed, or judged to be unsuitable or contraindicated. There are no psychological barriers present. Patient is capable to operate the device and there are no current or chronic infections, no coagulopathic/anticoagulation treatments, and no pacemaker or implantable cardioverter-defibrillators that need pre-op evaluation.   Risk, benefits, pros and cons of procedure were explained to the patient using models and diagrams and their questions were answered. The patient has severe anxiety and other medical issues that necessitates monitored anesthesia care (MAC). The procedure performed will be close to major nerves, arteries, spinal cord and/or joint structures. Due to the proximity of these structures, we need the patient to be still during the procedure. With the help of MAC, this will be safely achieved and decrease the risk of any complications.  2. Opted to administer Toradol into the lumbar paraspinals in office today. Risks with the procedure such as bleeding and infection was discussed in detail.  Medications: 1. Renewed Pregabalin 50 mg q12h prn.  Potential side effects were discussed which included dizziness, sedation, and pedal edema to name a few. If the patient cannot tolerate these side effects should they occur, then they will stop the medication. If the patient experiences sedation, they understand that they should not drive.   Follow up 1-2 weeks post injection for assessment of efficacy and further recommendations.  JESSICA Fallon attest that this documentation has been prepared under the direction and in the presence of provider Dr. Himanshu Griffith.   The documentation recorded by the scribe in my presence, accurately reflects the service I personally performed, and the decisions made by me with my edits as appropriate.    Himanshu Griffith, DO

## 2025-01-23 NOTE — HISTORY OF PRESENT ILLNESS
[FreeTextEntry1] : HISTORY OF PRESENT ILLNESS: This is a 54 year old woman complaining of bilateral foot pain. The patient has had this pain for 6 years. The pain has worsened in the last 3 years. The pain started after onset of diabetic neuropathy. Patient describes pain as severe. During the last month the pain has been nearly constant with symptoms worse at night. Pain described as burning, sharp and shooting. Pain is associated with numbness and pins and needles into the bilateral feet. Pain is increased with lying down, standing, sitting, walking and relaxation. Bowel or bladder habits have not changed.  Of note, patient has been a type 1 diabetic for many years.  She has also had to left foot surgeries as well as two left shoulder surgeries.  She most recently had a left hip bursectomy.    TODAY, 4-: Revisit encounter.   She is under our care for bilateral buttock pain. She has pain which is referred into the hip are and into the lateral thighs. She has significant tenderness along with stiffness and sharp pains. She is s/p a Bilateral sacroiliac joint injection on 3/25/24 which provided her significant relief of her left sided pain. The right sided pain continues to be bothersome. She is not able to sleep on the right side.  Patient has been a type 1 diabetic for many years. Her diabetes is uncontrolled, and her sugar levels fluctuate following the injection. Because of this, I will hold off on repeating the injection at this time.   TODAY: 10-: Revisit encounter.  At her last office visit, she was given DULoxetine however it was not helping at much so she stopped taking it. She is currently managing her pain with Pregabalin 100 mg q12h prn. She affords 50% reduction in pain and increase in function with the use of this medication. She now has tolerable pain secondary to her Diabetic Neuropathy. She states her sugar levels have been somewhat under control over the last couple of months.   She has however been experiencing some increased pain around her bilateral buttock area. She does note some referred pain into the hamstrings at times. She has a constant ache like sensation in the area. Her right side is the most severe as opposed to the left. She also notes pain referring into the groin at times. She has pain with internal and external rotation. The pain is severe and makes it difficult for her to perform her ADLs. She is pending a right hip injection on 10/21/24.   11-5-2024: Revisit encounter.   Since her last office visit, she underwent a right hip injection on 10-. Day of and present, she had no relief of pain. She walked out of the procedure feeling worsening pain. She continues today with ongoing severe pain in the right hip and buttock area. She does note referred pain wrapping around into the hip area. Her pain is made worse with sitting, standing or walking for prolonged periods of time. She also notes pain with internal and external rotation of the hips. Her pain is present daily. She rates the pain at a 7/10 on the pain scale. She has not been sleeping at night due to the pain. She is managing her pain with Pregabalin 100 mg q12h. She affords about 30-50% reduction in pain and increase in function with the use of this medication.  Of note, she is going to be undergoing surgical correction to her right foot in the coming weeks.   TODAY (12/31/24)  Patient is a 54-year-old female who is here today for follow-up, is under our care for bilateral hip pain and bilateral neuropathy secondary to DM type I. As for her bilateral neuropathy, patient is currently maintained on pregabalin 150 mg twice daily, patient states that the pregabalin is interacting with her Mounjaro, she is unable to control her blood sugar and she has had to increase her insulin intake due to that.  She is requesting to decrease the pregabalin dosage. As for her bilateral hip pain, Patient has referred pain into the groin and occasionally into the anterior portion of the thigh. Pain is made worse with weight bearing or getting up from a seated position. Pain is associated with sharp, stabbing pains. Pain is present daily and limits the patient's ADLs. Pain is worse in the morning and it gets better throughout the day.  She is undergone a right hip injection in the past on 10/21/2024 which gave her no relief. Of note, she underwent surgical correction of the right foot and she is healing well.  PRESENTING TODAY 01-: Last seen on 12/31/2024 and since then there has been no new complaints or acute changes. Patient is a 54-year-old female who is here today for follow-up, she is under our care for bilateral hip pain and bilateral neuropathy secondary to DM type I.  Today we reviewed the MRI of her bilateral hips in detail during today's visit as documented below. She continues with neuropathy and BL hip pain. She was cleared by neuropsych. We will proceed with a SCS trial at this time. Pain is an 8/10 in intensity. Patient denies any bowel or bladder dysfunction, incontinence, or saddle anesthesia.

## 2025-01-23 NOTE — PROCEDURE
[Trigger point 1-2 muscle groups] : trigger point 1-2 muscle groups [Bilateral] : bilaterally of the [Lumbar paraspinal muscle] : lumbar paraspinal muscle [Pain] : pain [Alcohol] : alcohol [Sterile technique used] : sterile technique used [___ cc    30mg] : Ketorolac (Toradol) ~Vcc of 30 mg  [Call if redness, pain or fever occur] : call if redness, pain or fever occur [Apply ice for 15min out of every hour for the next 12-24 hours as tolerated] : apply ice for 15 minutes out of every hour for the next 12-24 hours as tolerated [Previous OTC use and PT nontherapeutic] : patient has tried OTC's including aspirin, Ibuprofen, Aleve, etc or prescription NSAIDS, and/or exercises at home and/or physical therapy without satisfactory response [Risks, benefits, alternatives discussed / Verbal consent obtained] : the risks benefits, and alternatives have been discussed, and verbal consent was obtained [de-identified] : Chlorhexidine

## 2025-01-23 NOTE — DATA REVIEWED
[FreeTextEntry1] : MRI of the left hip dated 1/17/2025 suggest, 1. Tear of the anterior superior labrum. 2. Mild cartilaginous fraying in the hip joint. 3. Mild gluteus medius tendinosis.  MRI of the right hip dated 1/17/2025 suggest, 1. Degeneration and tear of the anterior superior labrum with small partial-thickness cartilaginous defect at the chondral labral junction. 2. Gluteus medius tendinosis.  X-ray of the hips taken on 1- IMPRESSION: No acute fracture or dislocation. 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 There is mild degenerative changes within bilateral sacroiliac joints, pubic symphysis, and visualized lumbar spine. There are vascular calcifications noted.  MRI of the lumbar spine taken on 2- IMPRESSION 1.  Mild L4-5 and L5-S1 degenerative change 2.  No significant thecal sac or nerve root compression

## 2025-01-23 NOTE — PHYSICAL EXAM
[de-identified] : L spine   No rashes, erythema, edema noted TTP b/l lumbar paraspinals and sciatic notch Positive straight leg raise bilaterally LLE: 5/5 strength RLE: 5/5 strength Sensation intact b/l LE  Severe b/l lower extremity burning pain

## 2025-03-26 NOTE — ASSESSMENT
[Diabetes Foot Care] : diabetes foot care [Long Term Vascular Complications] : long term vascular complications of diabetes [Carbohydrate Consistent Diet] : carbohydrate consistent diet [Importance of Diet and Exercise] : importance of diet and exercise to improve glycemic control, achieve weight loss and improve cardiovascular health [Retinopathy Screening] : Patient was referred to ophthalmology for retinopathy screening

## 2025-03-26 NOTE — HISTORY OF PRESENT ILLNESS
[FreeTextEntry1] : medically stable, recent hba1c was good, sees a gi doctor Brianne, high chromogranin A levels.

## 2025-05-08 NOTE — HISTORY OF PRESENT ILLNESS
[de-identified] : 55 year old female patient presents today initial consultation for recurring sinusitis,   hard time swallowing, itchy throat. She has had these symptoms for over 4 years.

## 2025-05-08 NOTE — ASSESSMENT
[FreeTextEntry1] : I personally reviewed, interpreted and discussed patient's CT images from May 2021 showing no chronic sinusitis at the time.  continue protonix

## 2025-05-08 NOTE — REASON FOR VISIT
[Initial Evaluation] : an initial evaluation for [FreeTextEntry2] : recurring sinusitis,   hard time swallowing, itchy throat

## 2025-05-08 NOTE — PROCEDURE
[Complicated Symptoms] : complicated symptoms requiring more thorough examination than provided by mirror [Globus] : globus [Flexible Endoscope] : examined with the flexible endoscope [Normal] : the nasopharynx was normal [Arytenoid Erythema ___ /4] : arytenoid erythema [unfilled]U/4

## 2025-05-09 NOTE — SOCIAL HISTORY
[House] : [unfilled] lives in a house  [Central Forced Air] : heating provided by central forced air [Central] : air conditioning provided by central unit [Dust Mite Covers] : has dust mite covers [Feather Pillows] : has feather pillows [Bedroom] :  in bedroom [Living Area] : in living area [None] : none [Humidifier] : does not use a humidifier [Dehumidifier] : does not use a dehumidifier [Cockroaches] : Patient states that there are no cockroaches in the home [Feather Comforter] : does not have a feather comforter [Basement] : not in the basement [Smokers in Household] : there are no smokers in the home [de-identified] : n/a [de-identified] : n/a

## 2025-05-09 NOTE — HISTORY OF PRESENT ILLNESS
[Asthma] : asthma [Venom Reactions] : venom reactions [Food Allergies] : food allergies [Drug Allergies] : drug allergies [de-identified] : HERNANDEZ DAVIS  is a 55 year old female who sees ENT for sinus issues, she had a CT-scan four years ago, she was told nothing was found, in the past she followed up with an allergist, she had allergy testing done that said she is allergic to dust mites, she experiences year nasal congestion, dry throat, she has to clear her throat often throughout the day, she has a history of acid reflux, in the past she has had a nasal endoscopy.  For over five years she has had to cut all food into small pieces due to feeling throat tightness. She has tried over the counter allergy medication but did not see any improvement. In the summer she breaks out in red dry patches, on legs and arms she wants to know if this is allergy related or eczema. She is here today for possible allergy testing and to know how to better treat her symptoms.

## 2025-05-09 NOTE — ASSESSMENT
[FreeTextEntry1] : 1. AR - fluticasone nasal + DM controls  2. Chronic Sinusitus - continue ENT follow up  3. GERD - continue GI follow up  4. AD - moisturize and derm eval if it ocntinues.